# Patient Record
Sex: FEMALE | NOT HISPANIC OR LATINO | URBAN - METROPOLITAN AREA
[De-identification: names, ages, dates, MRNs, and addresses within clinical notes are randomized per-mention and may not be internally consistent; named-entity substitution may affect disease eponyms.]

---

## 2019-08-15 ENCOUNTER — OFFICE VISIT (OUTPATIENT)
Dept: URBAN - METROPOLITAN AREA CLINIC 48 | Facility: CLINIC | Age: 74
End: 2019-08-15
Payer: MEDICARE

## 2019-08-15 DIAGNOSIS — H35.54 DYSTROPHIES PRIMARILY INVOLVING THE RETINAL PIGMENT EPITHELIUM: ICD-10-CM

## 2019-08-15 PROCEDURE — 92004 COMPRE OPH EXAM NEW PT 1/>: CPT | Performed by: OPHTHALMOLOGY

## 2019-08-15 RX ORDER — LEVOTHYROXINE SODIUM 50 UG/1
TABLET ORAL
Refills: 0 | Status: INACTIVE
Start: 2019-08-15 | End: 2020-01-09

## 2019-08-15 ASSESSMENT — INTRAOCULAR PRESSURE
OD: 17
OS: 18

## 2019-08-15 ASSESSMENT — VISUAL ACUITY
OS: 20/60
OD: 20/50

## 2019-08-15 NOTE — IMPRESSION/PLAN
Impression: Combined forms of age-related cataract, bilateral: H25.813. Plan: The patient has a visually significant cataract in both eyes. After discussion with the patient and careful examination it has been determined that a cataract in both eyes is accounting for a significant amount of the patient's visual symptoms. Cataract surgery and the associated risks, benefits, alternatives, expectations, and recovery were discussed in detail with the patient. All questions were answered. The patient understands that there may be some limitation in visual potential given any pre-existing ocular disease. Discussed option of eye drops with patient, patient elects Combo drops  8ml   , discussed possible side effects of drops. The patient desires cataract surgery in both eyes. Schedule cataract surgery in both eyes; left eye, then right eye. RL 2 Please interested in Toric lens, patient wants premium work up Bed note: Complex case Please schedule PO3 2nd eye with Dr. Samantha Meyer

## 2019-08-15 NOTE — IMPRESSION/PLAN
Impression: Dystrophies primarily involving the retinal pigment epithelium: H35.54. OD. Plan: Flat hyperpigmentation in inferior retina not elevated. will re-evaluate in 4 months DE or can be followed by Dr. Olena Olvera. 

RCT 4 months DE/ otherwise being followed by Dr. Olena Olvera

## 2019-08-28 ENCOUNTER — TESTING ONLY (OUTPATIENT)
Dept: URBAN - METROPOLITAN AREA CLINIC 48 | Facility: CLINIC | Age: 74
End: 2019-08-28
Payer: MEDICARE

## 2019-08-28 DIAGNOSIS — H25.813 COMBINED FORMS OF AGE-RELATED CATARACT, BILATERAL: Primary | ICD-10-CM

## 2019-08-28 PROCEDURE — 92136 OPHTHALMIC BIOMETRY: CPT | Performed by: OPHTHALMOLOGY

## 2019-08-28 ASSESSMENT — PACHYMETRY
OD: 23.03
OD: 2.82
OS: 23.04
OS: 2.73

## 2019-09-03 ENCOUNTER — SURGERY (OUTPATIENT)
Dept: URBAN - METROPOLITAN AREA SURGERY 26 | Facility: SURGERY | Age: 74
End: 2019-09-03
Payer: MEDICARE

## 2019-09-03 PROCEDURE — 66984 XCAPSL CTRC RMVL W/O ECP: CPT | Performed by: OPHTHALMOLOGY

## 2019-09-04 ENCOUNTER — POST-OPERATIVE VISIT (OUTPATIENT)
Dept: URBAN - METROPOLITAN AREA CLINIC 48 | Facility: CLINIC | Age: 74
End: 2019-09-04

## 2019-09-04 PROCEDURE — 99024 POSTOP FOLLOW-UP VISIT: CPT | Performed by: OPTOMETRIST

## 2019-09-04 ASSESSMENT — INTRAOCULAR PRESSURE: OS: 20

## 2019-09-11 ENCOUNTER — POST-OPERATIVE VISIT (OUTPATIENT)
Dept: URBAN - METROPOLITAN AREA CLINIC 48 | Facility: CLINIC | Age: 74
End: 2019-09-11

## 2019-09-11 PROCEDURE — 99024 POSTOP FOLLOW-UP VISIT: CPT | Performed by: OPHTHALMOLOGY

## 2019-09-11 ASSESSMENT — INTRAOCULAR PRESSURE: OS: 16

## 2019-09-11 ASSESSMENT — VISUAL ACUITY: OS: 20/20

## 2019-09-17 ENCOUNTER — SURGERY (OUTPATIENT)
Dept: URBAN - METROPOLITAN AREA SURGERY 26 | Facility: SURGERY | Age: 74
End: 2019-09-17
Payer: MEDICARE

## 2019-09-17 PROCEDURE — 66984 XCAPSL CTRC RMVL W/O ECP: CPT | Performed by: OPHTHALMOLOGY

## 2019-09-18 ENCOUNTER — POST-OPERATIVE VISIT (OUTPATIENT)
Dept: URBAN - METROPOLITAN AREA CLINIC 48 | Facility: CLINIC | Age: 74
End: 2019-09-18
Payer: MEDICARE

## 2019-09-18 PROCEDURE — 99024 POSTOP FOLLOW-UP VISIT: CPT | Performed by: OPTOMETRIST

## 2019-09-18 ASSESSMENT — INTRAOCULAR PRESSURE
OS: 15
OD: 15

## 2019-09-25 ENCOUNTER — POST-OPERATIVE VISIT (OUTPATIENT)
Dept: URBAN - METROPOLITAN AREA CLINIC 48 | Facility: CLINIC | Age: 74
End: 2019-09-25
Payer: MEDICARE

## 2019-09-25 PROCEDURE — 99024 POSTOP FOLLOW-UP VISIT: CPT | Performed by: OPHTHALMOLOGY

## 2019-09-25 ASSESSMENT — INTRAOCULAR PRESSURE
OD: 14
OS: 14

## 2019-10-11 ENCOUNTER — POST-OPERATIVE VISIT (OUTPATIENT)
Dept: URBAN - METROPOLITAN AREA CLINIC 48 | Facility: CLINIC | Age: 74
End: 2019-10-11

## 2019-10-11 DIAGNOSIS — Z09 ENCNTR FOR F/U EXAM AFT TRTMT FOR COND OTH THAN MALIG NEOPLM: Primary | ICD-10-CM

## 2019-10-11 PROCEDURE — 99024 POSTOP FOLLOW-UP VISIT: CPT | Performed by: OPTOMETRIST

## 2019-10-11 ASSESSMENT — INTRAOCULAR PRESSURE
OS: 15
OD: 17

## 2020-01-09 ENCOUNTER — OFFICE VISIT (OUTPATIENT)
Dept: URBAN - METROPOLITAN AREA CLINIC 48 | Facility: CLINIC | Age: 75
End: 2020-01-09
Payer: MEDICARE

## 2020-01-09 DIAGNOSIS — H26.493 OTHER SECONDARY CATARACT, BILATERAL: Primary | ICD-10-CM

## 2020-01-09 PROCEDURE — 92014 COMPRE OPH EXAM EST PT 1/>: CPT | Performed by: OPHTHALMOLOGY

## 2020-01-09 ASSESSMENT — INTRAOCULAR PRESSURE
OD: 15
OS: 13

## 2020-01-09 NOTE — IMPRESSION/PLAN
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 Plan: no change from last office visit.  

RTC 6 months DE ( long exam)

## 2020-01-09 NOTE — IMPRESSION/PLAN
Impression: Other secondary cataract, bilateral: H26.493. Plan: Risks, benefits, alternatives, and expectations of YAG capsulotomy were discussed.   

RTC  1 year Yag eval (long exam)

## 2020-07-09 ENCOUNTER — OFFICE VISIT (OUTPATIENT)
Dept: URBAN - METROPOLITAN AREA CLINIC 48 | Facility: CLINIC | Age: 75
End: 2020-07-09
Payer: MEDICARE

## 2020-07-09 DIAGNOSIS — H04.123 DRY EYE SYNDROME OF BILATERAL LACRIMAL GLANDS: Primary | ICD-10-CM

## 2020-07-09 PROCEDURE — 92012 INTRM OPH EXAM EST PATIENT: CPT | Performed by: OPHTHALMOLOGY

## 2020-07-09 ASSESSMENT — INTRAOCULAR PRESSURE
OS: 15
OD: 16

## 2020-07-09 NOTE — IMPRESSION/PLAN
Impression: Dry eye syndrome of bilateral lacrimal glands: H04.123. allergic component Plan: Patient to use zatidor  1-2 tmes a day OU

RTC 1 month DE ( long exam)

## 2024-10-30 ENCOUNTER — OFFICE VISIT (OUTPATIENT)
Dept: URBAN - METROPOLITAN AREA CLINIC 48 | Facility: CLINIC | Age: 79
End: 2024-10-30
Payer: MEDICARE

## 2024-10-30 DIAGNOSIS — E11.3512 DIABETES MELLITUS TYPE 2 WITH PROLIFERATIVE RETINOPATHY WITH MACULAR EDEMA, LEFT EYE: Primary | ICD-10-CM

## 2024-10-30 PROCEDURE — 99204 OFFICE O/P NEW MOD 45 MIN: CPT | Performed by: OPHTHALMOLOGY

## 2024-10-30 PROCEDURE — 92134 CPTRZ OPH DX IMG PST SGM RTA: CPT | Performed by: OPHTHALMOLOGY

## 2024-10-30 ASSESSMENT — INTRAOCULAR PRESSURE
OS: 16
OD: 15